# Patient Record
Sex: FEMALE | URBAN - METROPOLITAN AREA
[De-identification: names, ages, dates, MRNs, and addresses within clinical notes are randomized per-mention and may not be internally consistent; named-entity substitution may affect disease eponyms.]

---

## 2020-01-02 ENCOUNTER — APPOINTMENT (RX ONLY)
Dept: URBAN - METROPOLITAN AREA CLINIC 23 | Facility: CLINIC | Age: 40
Setting detail: DERMATOLOGY
End: 2020-01-02

## 2020-01-02 DIAGNOSIS — Z41.9 ENCOUNTER FOR PROCEDURE FOR PURPOSES OTHER THAN REMEDYING HEALTH STATE, UNSPECIFIED: ICD-10-CM

## 2020-01-02 PROCEDURE — ? IN-HOUSE DISPENSING PHARMACY

## 2020-01-02 PROCEDURE — ? FILLERS

## 2020-01-02 PROCEDURE — ? BOTOX

## 2020-01-02 NOTE — PROCEDURE: BOTOX
Right Pupillary Line Units: 0
Show Lateral Platysmal Band Units: Yes
Additional Area 6 Location: Rogelio Castillo
Show Right And Left Pupillary Line Units: No
Dilution (U/0.1 Cc): 2.5
Additional Area 4 Location: neck
Consent: Written consent obtained. Risks include but not limited to lid/brow ptosis, bruising, swelling, diplopia, temporary effect, incomplete chemical denervation.
Additional Area 2 Location: glabella
Detail Level: Detailed
Expiration Date (Month Year): 08/2022
Additional Area 1 Location: forehead 2 cm above brows
Forehead Units: 6
Additional Area 5 Location: high forehead
Additional Area 3 Location: axilla right and left
Lot #: [id number]
Post-Care Instructions: Patient instructed to not lie down for 4 hours and limit physical activity for 24 hours. Patient instructed not to travel by airplane for 48 hours.

## 2020-01-02 NOTE — PROCEDURE: FILLERS
Additional Area 3 Volume In Cc: 0
Filler: Restylane Refyne
Include Cannula Information In Note?: No
Lot #: [id number]
Additional Area 5 Location: NFLs.
Lot #: [id number] [facility]
Lot #: [id number]
Expiration Date (Month Year): 09/10/20
Anesthesia Type: 1% lidocaine with epinephrine
Tear Troughs Filler  Volume In Cc: 1
Lot #: [id number]
Detail Level: Detailed
Expiration Date (Month Year): 08/31/2020
Expiration Date (Month Year): 11/30/20
Include Cannula Information In Note?: Yes
Anesthesia Volume In Cc: 0.3
Expiration Date (Month Year): 05/19/2020
Price (Use Numbers Only, No Special Characters Or $): 0.00
Additional Area 1 Location: , vermillion lips and fine lines upper lips oral comesure
Include Cannula Brand?: DermaSculpt
Include Cannula Size?: 25G
Consent: Written consent obtained. Risks include but not limited to bruising, beading, irregular texture, ulceration, infection, allergic reaction, scar formation, incomplete augmentation, temporary nature, procedural pain.
Include Cannula Length?: 1.5 inch
Additional Anesthesia Volume In Cc: 6
Post-Care Instructions: Patient instructed to apply ice to reduce swelling.
Additional Area 1 Location: fine lines , perioral, oral commissures
Additional Area 1 Location: using the cannula to bilateral temples,
Additional Area 2 Location: oral commissures ,marionettes lines , upper lip lines
Additional Area 3 Location: right arm
Additional Area 1 Location: chin
Additional Area 2 Location: Remaining syringe will be injected in 7 days post hydase injections to tear through areas.
Additional Area 4 Location: vermillion lips, marionettes lines
Additional Area 3 Location: lateral  jawline, marionettes lines,
Map Statment: See [address] for Complete Details

## 2020-02-13 ENCOUNTER — RX ONLY (OUTPATIENT)
Age: 40
Setting detail: RX ONLY
End: 2020-02-13

## 2020-02-13 ENCOUNTER — APPOINTMENT (RX ONLY)
Dept: URBAN - METROPOLITAN AREA CLINIC 23 | Facility: CLINIC | Age: 40
Setting detail: DERMATOLOGY
End: 2020-02-13

## 2020-02-13 DIAGNOSIS — Z41.9 ENCOUNTER FOR PROCEDURE FOR PURPOSES OTHER THAN REMEDYING HEALTH STATE, UNSPECIFIED: ICD-10-CM

## 2020-02-13 PROCEDURE — ? BOTOX

## 2020-02-13 PROCEDURE — ? PULSED-DYE LASER

## 2020-02-13 PROCEDURE — ? FILLERS

## 2020-02-13 RX ORDER — DESONIDE 0.5 MG/ML
LOTION TOPICAL
Qty: 1 | Refills: 3 | Status: ERX | COMMUNITY
Start: 2020-02-13

## 2020-02-13 RX ORDER — LOTEPREDNOL ETABONATE 5 MG/G
OINTMENT OPHTHALMIC
Qty: 1 | Refills: 3 | Status: ERX | COMMUNITY
Start: 2020-02-13

## 2020-02-13 NOTE — PROCEDURE: BOTOX
Expiration Date (Month Year): 08/2022
Depressor Anguli Oris Units: 0
Additional Area 2 Location: glabella
Show Additional Area 2: Yes
Additional Area 6 Location: chin
Periorbital Skin Units: [address] [address]
Lot #: [id number]
Additional Area 3 Location: crowâs feet
Detail Level: Detailed
Show Right And Left Brow Units: No
Additional Area 4 Location: neck bands
Post-Care Instructions: Patient instructed to not lie down for 4 hours and limit physical activity for 24 hours. Patient instructed not to travel by airplane for 48 hours.
Dilution (U/0.1 Cc): 2.5
Additional Area 1 Location: high forehead 2 cm above brows
Forehead Units: 6
Additional Area 5 Location: bunny lines
Consent: Written consent obtained. Risks include but not limited to lid/brow ptosis, bruising, swelling, diplopia, temporary effect, incomplete chemical denervation.

## 2020-02-13 NOTE — PROCEDURE: PULSED-DYE LASER
Treated Area: small area
Fluence In J/Cm2 (Optional): 6.50
Detail Level: Detailed
Pulse Duration: 6 ms
Pulse Duration: 10 ms
Delay Time (Ms): [facility]
Spot Size: 10 mm
Delay Time (Ms): 10
Cryogen Time (Ms): [address] [address]
Fluence In J/Cm2 (Optional): 12.00
Consent: Written consent obtained, risks reviewed including but not limited to crusting, scabbing, blistering, scarring, darker or lighter pigmentary change, incidental hair removal, bruising, and/or incomplete removal.
Delay Time (Ms): 20
Spot Size: 7 mm
Delay Time (Ms): 0
Laser Type: Vbeam 595nm
Location (Required For Details To Render In Note But Body Touch Will Also Count For First Location): mid face
Spot Size: 10x3 mm
Cryogen Time (Ms): 30
Cryogen Time (Ms): 27
Immediate Endpoint: purpura
Post-Care Instructions: I reviewed with the patient in detail post-care instructions. Patient should stay away from the sun and wear sun protection until treated areas are fully healed.

## 2020-02-13 NOTE — PROCEDURE: FILLERS
Tear Troughs Filler  Volume In Cc: 0
Include Cannula Brand?: DermaSculpt
Lot #: [id number]
Anesthesia Type: 1% lidocaine with epinephrine
Include Cannula Information In Note?: No
Additional Area 4 Location: vermillion lips, marionettes lines
Lot #: [id number]
Include Cannula Information In Note?: Yes
Additional Area 3 Location: perioral fine lines, vermillion lips
Expiration Date (Month Year): 06/09/21
Additional Area 4 Volume In Cc: 1
Include Cannula Length?: 1.5 inch
Additional Area 4 Location: marionettes ,tear troughs, nasal labial fold
Additional Area 2 Location: oral commissures ,marionettes lines , upper lip lines
Lot #: [id number]
Include Cannula Size?: 25G
Additional Area 3 Location: vermillion lips, tear through, lateral cheeks
Filler: Restylane Defyne
Post-Care Instructions: Patient instructed to apply ice to reduce swelling.
Additional Area 1 Location: NLFâs, glabella fine lines. Using the cannula to bilateral tear through area.
Additional Area 5 Location: oral commissures, upper lip lines, vermillion lips
Consent: Written consent obtained. Risks include but not limited to bruising, beading, irregular texture, ulceration, infection, allergic reaction, scar formation, incomplete augmentation, temporary nature, procedural pain.
Map Statment: See [address] for Complete Details
Additional Area 2 Location: Remaining syringe will be injected in 7 days post hydase injections to tear through areas.
Additional Area 1 Location: Perioral, vermillion lips, upper lip fine lines
Expiration Date (Month Year): 11/30/20
Additional Anesthesia Volume In Cc: 6
Additional Area 1 Location: chin
Price (Use Numbers Only, No Special Characters Or $): 0.00
Lot #: [id number] [facility]
Anesthesia Volume In Cc: 0.3
Expiration Date (Month Year): 3/31/2021
Additional Area 5 Location: jawlines, lateral cheeks
Detail Level: Detailed
Additional Area 1 Location: fine lines , perioral, oral commissures
Expiration Date (Month Year): 05/19/2020

## 2020-02-13 NOTE — HPI: COSMETIC (DYSPORT)
Have You Had Dysport Before?: has had dysport
Additional History: Pt informs doing Dysport in [place].

## 2020-09-10 ENCOUNTER — TRANSCRIPTION ENCOUNTER (OUTPATIENT)
Age: 40
End: 2020-09-10

## 2020-09-11 ENCOUNTER — OUTPATIENT (OUTPATIENT)
Dept: OUTPATIENT SERVICES | Facility: HOSPITAL | Age: 40
LOS: 1 days | Discharge: HOME | End: 2020-09-11
Payer: COMMERCIAL

## 2020-09-11 PROCEDURE — 88305 TISSUE EXAM BY PATHOLOGIST: CPT | Mod: 26

## 2020-09-23 LAB — SURGICAL PATHOLOGY STUDY: SIGNIFICANT CHANGE UP

## 2021-02-24 ENCOUNTER — APPOINTMENT (RX ONLY)
Dept: URBAN - METROPOLITAN AREA CLINIC 23 | Facility: CLINIC | Age: 41
Setting detail: DERMATOLOGY
End: 2021-02-24

## 2021-02-24 VITALS — TEMPERATURE: 97.3 F

## 2021-06-10 ENCOUNTER — APPOINTMENT (RX ONLY)
Dept: URBAN - METROPOLITAN AREA CLINIC 23 | Facility: CLINIC | Age: 41
Setting detail: DERMATOLOGY
End: 2021-06-10

## 2021-06-10 VITALS — TEMPERATURE: 97.4 F

## 2021-06-10 DIAGNOSIS — Z41.9 ENCOUNTER FOR PROCEDURE FOR PURPOSES OTHER THAN REMEDYING HEALTH STATE, UNSPECIFIED: ICD-10-CM

## 2021-06-10 PROCEDURE — ? PROFOUND

## 2021-06-10 PROCEDURE — ? CANDELA NORDLYS

## 2021-06-10 NOTE — PROCEDURE: PROFOUND
Spacinmm
Temperature (Optional): 66[address] [facility]
Pre-Treatment Photos?: Yes
Spacinmm
Post-Care Instructions: I reviewed with the patient in detail post-care instructions. Patient should stay away from the sun and wear sun protection until fully healed.
Time Duration (Optional): 3ms
Total Insertions (Required): 100
Local Anesthesia: 0.1% lidocaine with 1:1,000,000 epinephrine (tumescent anesthesia)
Temperature (Optional): [place]fant
Temperature (Optional): 67
Time Duration (Optional): 4 sec
Device Serial Number (Optional): Dermal : LOT# [id number] EXP: 2022-09-09 SubQ: LOT #: [id number] EXP: 2021-05-29
Post-Procedure Text: Following the procedure, post care was reviewed with the patient. Recommended Alastin skin nectar and arnica topical and tablets fro swelling and bruising.
Length Topical Anesthesia Applied (Optional): 20 minutes
Anesthesia Volume In Cc: 15
Topical Anesthesia?: 20% benzocaine, 8% lidocaine, and 8% tetracaine
Consent: Written consent obtained, risks reviewed including but not limited to crusting, scabbing, blistering, scarring, darker or lighter pigmentary change, and/or incomplete improvement.
Detail Level: Detailed
Time Duration (Optional): 3.0
Temperature (Optional): 67c
Treatment Number: 1
Start Time (Optional): 1:15 pm
External Cooling Fan Speed: 5
Pre-Procedure Text: After consent was obtained the treatment areas were cleaned and treated using the parameters mentioned above.
Location: behind left thigh
Location: right thigh

## 2021-06-10 NOTE — PROCEDURE: CANDELA NORDLYS
Pulse Duration In Ms (Will Not Render If Zero): 0
Pulse Time (Will Not Render If Zero): 17.5
Passes (Will Not Render If Zero): 1
Location: full face
Spot Size (Optional): 3 nm
Passes (Will Not Render If Zero): 10
Modality: SWT
Pulse Duration In Ms (Will Not Render If Zero): 12
Pulse Time (Will Not Render If Zero): [facility] [facility]
Flu[facility]Cm[facility]
Spot Size (Optional): 1.5 nm
Length Topical Anesthesia Applied (Optional): 30 minutes
Fluence In J/Cm2(Optional): 14.1
Hsv Prophylaxis Prescription: Valtrex 500mg BID starting the day of procedures and continuing until all sites healed
Pulse Time In Ms (Will Not Render If Zero): 20
Fluence In J/Cm2: [address] [facility]
Consent: Written consent obtained, risks reviewed including but not limited to crusting, scabbing, blistering, scarring, darker or lighter pigmentary change, and/or incomplete removal.
Topical Anesthesia?: 20% benzocaine, 8% lidocaine, 4% tetracaine
Hsv Prophylaxis: no
Post-Care Instructions: I reviewed with the patient in detail post-care instructions.
Location: cheeks
Applicator: WA (530-750nm)
Eye Shielding Text (Leave Blank If Unwanted- Will Be Inserted If Selecting Eye Shields): The intraocular eye shields were placed. 2 drops of intraocular tetracaine HCL ophthalmic 0.5% solution was administered. The eye shields were coated with ophthalmic bacitracin prior to insertion. After the shields were removed the eyes were flushed with normal saline.
Indication Override (Free Text): under eyes
Location: nose
Detail Level: Zone
Location: axillae
Fluence In J/Cm2: 100

## 2021-08-11 ENCOUNTER — RX ONLY (OUTPATIENT)
Age: 41
Setting detail: RX ONLY
End: 2021-08-11

## 2021-08-11 RX ORDER — VALACYCLOVIR HYDROCHLORIDE 500 MG/1
BID TABLET, FILM COATED ORAL
Qty: 20 | Refills: 0 | Status: ERX | COMMUNITY
Start: 2021-08-11

## 2021-08-11 RX ORDER — CEPHALEXIN 500 MG/1
BID CAPSULE ORAL
Qty: 20 | Refills: 0 | Status: ERX | COMMUNITY
Start: 2021-08-11

## 2021-08-26 ENCOUNTER — APPOINTMENT (RX ONLY)
Dept: URBAN - METROPOLITAN AREA CLINIC 23 | Facility: CLINIC | Age: 41
Setting detail: DERMATOLOGY
End: 2021-08-26

## 2021-08-26 ENCOUNTER — RX ONLY (OUTPATIENT)
Age: 41
Setting detail: RX ONLY
End: 2021-08-26

## 2021-08-26 DIAGNOSIS — Z41.9 ENCOUNTER FOR PROCEDURE FOR PURPOSES OTHER THAN REMEDYING HEALTH STATE, UNSPECIFIED: ICD-10-CM

## 2021-08-26 PROCEDURE — ? ADDITIONAL NOTES

## 2021-08-26 RX ORDER — CLOBETASOL PROPIONATE 0.5 MG/G
1 OINTMENT TOPICAL BID
Qty: 1 | Refills: 1 | Status: ERX | COMMUNITY
Start: 2021-08-26

## 2021-08-26 NOTE — PROCEDURE: ADDITIONAL NOTES
Additional Notes: Written consent obtained, risks reviewed including but not limited to crusting, scabbing, blistering, scarring, darker\\nor lighter pigmentary change, incomplete improvement of dyschromia, wrinkles, prolonged erythema and facial\\nswelling, infection and bleeding. \\n\\nPhotos taken: Yes\\nAnesthesia: topical Lidocaine/Prilocaine & local infiltration\\nAmount of anesthesia: 1cc \\nType of eye protection: goggles\\n\\nCO2RE Laser with the following settings:\\n\\nSite: under eyes\\nMode: mid\\nFractionated coverage:30% \\nRing Size: 3\\nRing mj: 216\\nRing Fluence: 19. 1\\nCore mg: \\nCore Fluence:\\n\\n\\n\\nI reviewed with the patient in detail post-care instructions. Patient should avoid sun until area fully healed. Pt should apply vaseline to treated areas, and remove crusts gently with water-vinegar soaks.

## 2021-08-27 ENCOUNTER — APPOINTMENT (RX ONLY)
Dept: URBAN - METROPOLITAN AREA CLINIC 23 | Facility: CLINIC | Age: 41
Setting detail: DERMATOLOGY
End: 2021-08-27

## 2021-08-27 DIAGNOSIS — Z41.9 ENCOUNTER FOR PROCEDURE FOR PURPOSES OTHER THAN REMEDYING HEALTH STATE, UNSPECIFIED: ICD-10-CM

## 2021-08-27 PROCEDURE — ? TREATMENT REGIMEN

## 2021-08-30 ENCOUNTER — APPOINTMENT (RX ONLY)
Dept: URBAN - METROPOLITAN AREA CLINIC 23 | Facility: CLINIC | Age: 41
Setting detail: DERMATOLOGY
End: 2021-08-30

## 2021-08-30 DIAGNOSIS — Z41.9 ENCOUNTER FOR PROCEDURE FOR PURPOSES OTHER THAN REMEDYING HEALTH STATE, UNSPECIFIED: ICD-10-CM

## 2021-08-30 PROCEDURE — ? TREATMENT REGIMEN

## 2021-09-07 ENCOUNTER — RX ONLY (OUTPATIENT)
Age: 41
Setting detail: RX ONLY
End: 2021-09-07

## 2021-09-07 RX ORDER — HYDROCORTISONE 25 MG/G
CREAM TOPICAL
Qty: 454 | Refills: 1 | Status: ERX | COMMUNITY
Start: 2021-09-07

## 2021-09-07 RX ORDER — METHYLPREDNISOLONE 4 MG/1
TABLET ORAL
Qty: 1 | Refills: 0 | Status: ERX | COMMUNITY
Start: 2021-09-07

## 2023-06-23 PROBLEM — Z00.00 ENCOUNTER FOR PREVENTIVE HEALTH EXAMINATION: Status: ACTIVE | Noted: 2023-06-23

## 2023-06-30 ENCOUNTER — OUTPATIENT (OUTPATIENT)
Dept: OUTPATIENT SERVICES | Facility: HOSPITAL | Age: 43
LOS: 1 days | End: 2023-06-30
Payer: COMMERCIAL

## 2023-06-30 ENCOUNTER — APPOINTMENT (OUTPATIENT)
Dept: CT IMAGING | Facility: HOSPITAL | Age: 43
End: 2023-06-30

## 2023-06-30 PROCEDURE — 70480 CT ORBIT/EAR/FOSSA W/O DYE: CPT | Mod: 26

## 2023-06-30 PROCEDURE — 70480 CT ORBIT/EAR/FOSSA W/O DYE: CPT

## 2025-05-02 ENCOUNTER — APPOINTMENT (OUTPATIENT)
Dept: URBAN - METROPOLITAN AREA CLINIC 23 | Facility: CLINIC | Age: 45
Setting detail: DERMATOLOGY
End: 2025-05-02

## 2025-05-02 DIAGNOSIS — Z41.9 ENCOUNTER FOR PROCEDURE FOR PURPOSES OTHER THAN REMEDYING HEALTH STATE, UNSPECIFIED: ICD-10-CM

## 2025-05-02 PROCEDURE — ? RECOMMENDATIONS

## 2025-05-02 PROCEDURE — ? FILLERS

## 2025-05-02 NOTE — PROCEDURE: RECOMMENDATIONS
Detail Level: Zone
Recommendations (Free Text): 1940 Fraxal/V-beam full face/ profound to thighs\\nAlpharet/ Trio / voluma define
Render Risk Assessment In Note?: no
4 mm right UPJ stone

## 2025-05-02 NOTE — PROCEDURE: FILLERS
Lateral Face Filler Volume In Cc: 0
Additional Area 4 Location: perioral fine lines, marionette lines, oral commissures
Include Cannula Length?: 1.5 inch
Post-Care Instructions: Patient instructed to apply ice to reduce swelling.
Include Cannula Brand?: DermaSculpt
Additional Area 2 Location: delia oral
Additional Area 5 Location: Vermillion Lips, Perioral Fine Lines
Show Inventory Tab: Show
Include Cannula Size?: 25G
Include Cannula Information In Note?: No
Additional Area 3 Location: cheeks, jawline
Number Of Syringes (Required For Inventory): 1
Include Cannula Information In Note?: Yes
Anesthesia Volume In Cc: 0.5
Additional Area 1 Location: nlfs, marionette lines, perioral fine lines
Additional Area 1 Location: lateral jaw
Additional Area 2 Location: marionette lines, oral commesure and jawline
Additional Area 2 Location: delia oral fine lines
Additional Area 3 Location: jawline, nlfs, marionette lines, chin
Additional Area 1 Location: lat cheeks and perioral area
Additional Area 4 Location: jawline, cheeks
Additional Area 2 Location: lateral cheeks, jawline
Filler: Restylane Defyne
Map Statment: See Attach Map for Complete Details
Additional Area 5 Location: ear lobes
Inventory Information: This plan will send filler information to inventory based on the fillers you select. Multiple fillers can be sent but you must ensure you select the appropriate fillers in the inventory tab.
Additional Area 3 Location: neck, jawline, temples
Detail Level: Detailed
Consent: Written consent obtained. Risks include but not limited to bruising, beading, irregular texture, ulceration, infection, allergic reaction, scar formation, incomplete augmentation, temporary nature, procedural pain.
Additional Area 1 Location: chin dimples

## 2025-07-29 ENCOUNTER — APPOINTMENT (OUTPATIENT)
Dept: URBAN - METROPOLITAN AREA CLINIC 23 | Facility: CLINIC | Age: 45
Setting detail: DERMATOLOGY
End: 2025-07-29

## 2025-07-29 DIAGNOSIS — Z41.9 ENCOUNTER FOR PROCEDURE FOR PURPOSES OTHER THAN REMEDYING HEALTH STATE, UNSPECIFIED: ICD-10-CM

## 2025-07-29 PROCEDURE — ? PULSED-DYE LASER

## 2025-07-29 PROCEDURE — ? CANDELA NORDLYS
